# Patient Record
Sex: FEMALE | Race: BLACK OR AFRICAN AMERICAN | NOT HISPANIC OR LATINO | Employment: UNEMPLOYED | URBAN - METROPOLITAN AREA
[De-identification: names, ages, dates, MRNs, and addresses within clinical notes are randomized per-mention and may not be internally consistent; named-entity substitution may affect disease eponyms.]

---

## 2023-05-24 ENCOUNTER — TELEPHONE (OUTPATIENT)
Dept: PRIMARY CARE | Facility: CLINIC | Age: 48
End: 2023-05-24
Payer: COMMERCIAL

## 2023-05-24 DIAGNOSIS — Z12.31 ENCOUNTER FOR SCREENING MAMMOGRAM FOR MALIGNANT NEOPLASM OF BREAST: Primary | ICD-10-CM

## 2023-05-25 ENCOUNTER — TELEPHONE (OUTPATIENT)
Dept: PRIMARY CARE | Facility: CLINIC | Age: 48
End: 2023-05-25

## 2023-07-07 ENCOUNTER — OFFICE VISIT (OUTPATIENT)
Dept: PRIMARY CARE | Facility: CLINIC | Age: 48
End: 2023-07-07
Payer: COMMERCIAL

## 2023-07-07 VITALS
TEMPERATURE: 97.2 F | SYSTOLIC BLOOD PRESSURE: 160 MMHG | HEIGHT: 64 IN | WEIGHT: 150 LBS | HEART RATE: 95 BPM | DIASTOLIC BLOOD PRESSURE: 96 MMHG | BODY MASS INDEX: 25.61 KG/M2 | OXYGEN SATURATION: 99 %

## 2023-07-07 DIAGNOSIS — L84 PLANTAR CALLUS: ICD-10-CM

## 2023-07-07 DIAGNOSIS — L40.9 PSORIASIS: Primary | ICD-10-CM

## 2023-07-07 DIAGNOSIS — Z72.0 TOBACCO USE: ICD-10-CM

## 2023-07-07 DIAGNOSIS — L40.50 PSORIATIC ARTHROPATHY (MULTI): ICD-10-CM

## 2023-07-07 PROBLEM — M19.90 ARTHRITIS: Status: ACTIVE | Noted: 2023-07-07

## 2023-07-07 PROBLEM — R92.8 ABNORMAL MAMMOGRAM: Status: ACTIVE | Noted: 2023-07-07

## 2023-07-07 PROBLEM — L30.9 ECZEMA: Status: ACTIVE | Noted: 2023-07-07

## 2023-07-07 PROBLEM — K62.5 BRBPR (BRIGHT RED BLOOD PER RECTUM): Status: ACTIVE | Noted: 2023-07-07

## 2023-07-07 PROBLEM — R10.32 ABDOMINAL PAIN, LLQ: Status: ACTIVE | Noted: 2023-07-07

## 2023-07-07 PROBLEM — L85.1: Status: ACTIVE | Noted: 2023-07-07

## 2023-07-07 PROBLEM — K92.1 BLOOD IN STOOL: Status: ACTIVE | Noted: 2023-07-07

## 2023-07-07 PROBLEM — K64.9 HEMORRHOIDS: Status: ACTIVE | Noted: 2023-07-07

## 2023-07-07 PROBLEM — E55.9 VITAMIN D DEFICIENCY: Status: ACTIVE | Noted: 2023-07-07

## 2023-07-07 PROBLEM — F32.A DEPRESSION: Status: ACTIVE | Noted: 2023-07-07

## 2023-07-07 PROBLEM — K58.1 IRRITABLE BOWEL SYNDROME WITH CONSTIPATION: Status: ACTIVE | Noted: 2023-07-07

## 2023-07-07 PROBLEM — M62.89 PELVIC FLOOR TENSION: Status: ACTIVE | Noted: 2023-07-07

## 2023-07-07 PROCEDURE — 99214 OFFICE O/P EST MOD 30 MIN: CPT | Performed by: NURSE PRACTITIONER

## 2023-07-07 RX ORDER — ASPIRIN 81 MG
1 TABLET, DELAYED RELEASE (ENTERIC COATED) ORAL DAILY
COMMUNITY
Start: 2021-07-13

## 2023-07-07 RX ORDER — FLUOCINONIDE CREAM (EMULSIFIED BASE) 0.5 MG/G
CREAM TOPICAL 2 TIMES DAILY
COMMUNITY

## 2023-07-07 RX ORDER — BROMPHENIRAMINE MALEATE, DEXTROMETHORPHAN HBR, PHENYLEPHRINE HCL, DIPHENHYDRAMINE HCL, PHENYLEPHRINE HCL 0.52G
KIT ORAL
COMMUNITY
Start: 2021-04-30

## 2023-07-07 RX ORDER — ERGOCALCIFEROL 1.25 MG/1
CAPSULE ORAL
COMMUNITY

## 2023-07-07 RX ORDER — HYDROCORTISONE ACETATE 25 MG/1
SUPPOSITORY RECTAL
COMMUNITY
Start: 2022-04-12

## 2023-07-07 RX ORDER — METHYLPREDNISOLONE 4 MG/1
TABLET ORAL
Qty: 21 TABLET | Refills: 0 | Status: SHIPPED | OUTPATIENT
Start: 2023-07-07 | End: 2023-07-14

## 2023-07-07 RX ORDER — MELOXICAM 15 MG/1
15 TABLET ORAL DAILY
Qty: 30 TABLET | Refills: 11 | Status: SHIPPED | OUTPATIENT
Start: 2023-07-07 | End: 2024-07-06

## 2023-07-07 ASSESSMENT — PATIENT HEALTH QUESTIONNAIRE - PHQ9
2. FEELING DOWN, DEPRESSED OR HOPELESS: NOT AT ALL
SUM OF ALL RESPONSES TO PHQ9 QUESTIONS 1 & 2: 0
1. LITTLE INTEREST OR PLEASURE IN DOING THINGS: NOT AT ALL

## 2023-07-07 NOTE — PROGRESS NOTES
"Subjective   Patient ID: Julia Benites is a 48 y.o. female who presents for Follow-up (PAIN ALL OVER ESPECIALLY FEET).  She has psoriatic arthritis , she has tried treatments for psoriasis , the biologics were too strong for her , she was on methotrexate and on prednisone , currently is not on anything   Has psoriasis mostly on the legs and feet   Now she feels the aching all over her body.  She still smokes occasionally   She had a colonoscopy   It is hard to get up in the am with her arthritis and move around      I spent 23 minutes with the patients and 10 minutes writing the chart     Review of Systems    Objective   BP (!) 160/96   Pulse 95   Temp 36.2 °C (97.2 °F)   Ht 1.626 m (5' 4\")   Wt 68 kg (150 lb)   SpO2 99%   BMI 25.75 kg/m²     Physical Exam  Vitals and nursing note reviewed.   Constitutional:       Appearance: Normal appearance.   HENT:      Head: Normocephalic.      Nose: Nose normal.   Eyes:      Conjunctiva/sclera: Conjunctivae normal.      Pupils: Pupils are equal, round, and reactive to light.   Cardiovascular:      Rate and Rhythm: Normal rate and regular rhythm.   Pulmonary:      Effort: Pulmonary effort is normal.      Breath sounds: Normal breath sounds.   Abdominal:      General: Abdomen is flat. Bowel sounds are normal.      Palpations: Abdomen is soft.   Musculoskeletal:         General: Normal range of motion.      Cervical back: Normal range of motion and neck supple.        Feet:    Feet:      Comments: Plantar callus or a corn   Skin:     General: Skin is warm and dry.             Comments:  Dry Plaque lesions on the legs    Neurological:      General: No focal deficit present.      Mental Status: She is alert and oriented to person, place, and time. Mental status is at baseline.   Psychiatric:         Mood and Affect: Mood normal.         Behavior: Behavior normal.         Thought Content: Thought content normal.         Judgment: Judgment normal.         Assessment/Plan "   Problem List Items Addressed This Visit       Psoriasis - Primary    Relevant Medications    methylPREDNISolone (Medrol Dospak) 4 mg tablets    fluocinonide (Lidex) 0.05 % cream    Other Relevant Orders    Referral to Dermatology    Psoriatic arthropathy (CMS/[facility])    Relevant Medications    meloxicam (Mobic) 15 mg tablet    Other Relevant Orders    Referral to Rheumatology     Other Visit Diagnoses       Plantar callus        Relevant Orders    Referral to Podiatry    Tobacco use

## 2023-07-09 RX ORDER — FLUOCINONIDE 0.5 MG/G
CREAM TOPICAL 2 TIMES DAILY PRN
Qty: 60 G | Refills: 2 | Status: SHIPPED | OUTPATIENT
Start: 2023-07-09 | End: 2024-07-08

## 2023-08-09 NOTE — PATIENT INSTRUCTIONS
Some people see if a gluten fee diet will help   Exercise   Quitting smoking helps   Getting more sleep helps   Hopefully you can sleep better   If you have anxiety   Look at the eugenio mindfulness       It may be good to take something for anxiety so you can quit smoking   Follow up in 3 month     No

## 2023-10-13 ENCOUNTER — TELEMEDICINE (OUTPATIENT)
Dept: PRIMARY CARE | Facility: CLINIC | Age: 48
End: 2023-10-13
Payer: COMMERCIAL

## 2023-10-13 DIAGNOSIS — L40.50 PSORIATIC ARTHROPATHY (MULTI): Primary | ICD-10-CM

## 2023-10-13 PROCEDURE — 99213 OFFICE O/P EST LOW 20 MIN: CPT | Performed by: NURSE PRACTITIONER

## 2023-10-13 NOTE — LETTER
October 13, 2023     Patient: Julia Benites   YOB: 1975   Date of Visit: 10/13/2023       To Whom It May Concern:    Julia Benites was seen in my clinic on 10/13/2023. Please excuse Julia for her absence from work on this day to make the appointment.    If you have any questions or concerns, please don't hesitate to call.         Sincerely,         CAYLA Robbins-CNP

## 2023-10-22 NOTE — PROGRESS NOTES
2Subjective   Patient ID: Julia Benites is a 48 y.o. female who presents for No chief complaint on file..    HPI   The patient was not feeling well and wanted to talk   Her joints bother her , feet ankles knees hip all of the joint , for the last since the age of 35   Her psoriasis is all over . She has not told any of her dermatolologists about her joint pains before   Advised her about psoriatic arthropathy   She no longer has hair on her legs   We had a shared decision for her to follow up in derm    I suggested Dr Greene who specializes in psoriasis  I am recommending for now some prednisone   Review of Systems    Objective   There were no vitals taken for this visit.    Physical Exam    Assessment/Plan   Problem List Items Addressed This Visit             ICD-10-CM    Psoriatic arthropathy (CMS/[facility]) - Primary L40.50    Relevant Medications    predniSONE (Deltasone) 20 mg tablet    Other Relevant Orders    Arthritis Panel (CMS)    Referral to Dermatology

## 2023-10-24 RX ORDER — PREDNISONE 20 MG/1
TABLET ORAL
Qty: 18 TABLET | Refills: 0 | Status: SHIPPED | OUTPATIENT
Start: 2023-10-24

## 2023-11-06 ENCOUNTER — APPOINTMENT (OUTPATIENT)
Dept: RADIOLOGY | Facility: HOSPITAL | Age: 48
End: 2023-11-06
Payer: COMMERCIAL

## 2023-11-06 ENCOUNTER — HOSPITAL ENCOUNTER (EMERGENCY)
Facility: HOSPITAL | Age: 48
Discharge: HOME | End: 2023-11-06
Attending: INTERNAL MEDICINE
Payer: COMMERCIAL

## 2023-11-06 VITALS
OXYGEN SATURATION: 100 % | WEIGHT: 140 LBS | TEMPERATURE: 98.4 F | HEART RATE: 63 BPM | BODY MASS INDEX: 23.9 KG/M2 | RESPIRATION RATE: 16 BRPM | HEIGHT: 64 IN | SYSTOLIC BLOOD PRESSURE: 155 MMHG | DIASTOLIC BLOOD PRESSURE: 93 MMHG

## 2023-11-06 DIAGNOSIS — N12 PYELONEPHRITIS: ICD-10-CM

## 2023-11-06 DIAGNOSIS — R31.0 GROSS HEMATURIA: Primary | ICD-10-CM

## 2023-11-06 LAB
ALBUMIN SERPL BCP-MCNC: 4.4 G/DL (ref 3.4–5)
ALP SERPL-CCNC: 32 U/L (ref 33–110)
ALT SERPL W P-5'-P-CCNC: 7 U/L (ref 7–45)
ANION GAP SERPL CALC-SCNC: 15 MMOL/L (ref 10–20)
APPEARANCE UR: ABNORMAL
APTT PPP: 29 SECONDS (ref 27–38)
AST SERPL W P-5'-P-CCNC: 22 U/L (ref 9–39)
B-HCG SERPL-ACNC: <2 MIU/ML
BASOPHILS # BLD AUTO: 0.03 X10*3/UL (ref 0–0.1)
BASOPHILS NFR BLD AUTO: 0.3 %
BILIRUB SERPL-MCNC: 0.4 MG/DL (ref 0–1.2)
BILIRUB UR STRIP.AUTO-MCNC: NEGATIVE MG/DL
BUN SERPL-MCNC: 14 MG/DL (ref 6–23)
CALCIUM SERPL-MCNC: 9.5 MG/DL (ref 8.6–10.3)
CHLORIDE SERPL-SCNC: 107 MMOL/L (ref 98–107)
CO2 SERPL-SCNC: 20 MMOL/L (ref 21–32)
COLOR UR: ABNORMAL
CREAT SERPL-MCNC: 0.81 MG/DL (ref 0.5–1.05)
EOSINOPHIL # BLD AUTO: 0.11 X10*3/UL (ref 0–0.7)
EOSINOPHIL NFR BLD AUTO: 1.3 %
ERYTHROCYTE [DISTWIDTH] IN BLOOD BY AUTOMATED COUNT: 14.1 % (ref 11.5–14.5)
GFR SERPL CREATININE-BSD FRML MDRD: 89 ML/MIN/1.73M*2
GLUCOSE SERPL-MCNC: 72 MG/DL (ref 74–99)
GLUCOSE UR STRIP.AUTO-MCNC: NEGATIVE MG/DL
HCG UR QL IA.RAPID: NEGATIVE
HCT VFR BLD AUTO: 32.8 % (ref 36–46)
HGB BLD-MCNC: 11.1 G/DL (ref 12–16)
HOLD SPECIMEN: NORMAL
IMM GRANULOCYTES # BLD AUTO: 0.04 X10*3/UL (ref 0–0.7)
IMM GRANULOCYTES NFR BLD AUTO: 0.5 % (ref 0–0.9)
INR PPP: 1.1 (ref 0.9–1.1)
KETONES UR STRIP.AUTO-MCNC: NEGATIVE MG/DL
LEUKOCYTE ESTERASE UR QL STRIP.AUTO: ABNORMAL
LYMPHOCYTES # BLD AUTO: 2.22 X10*3/UL (ref 1.2–4.8)
LYMPHOCYTES NFR BLD AUTO: 25.6 %
MCH RBC QN AUTO: 29.9 PG (ref 26–34)
MCHC RBC AUTO-ENTMCNC: 33.8 G/DL (ref 32–36)
MCV RBC AUTO: 88 FL (ref 80–100)
MONOCYTES # BLD AUTO: 0.47 X10*3/UL (ref 0.1–1)
MONOCYTES NFR BLD AUTO: 5.4 %
NEUTROPHILS # BLD AUTO: 5.79 X10*3/UL (ref 1.2–7.7)
NEUTROPHILS NFR BLD AUTO: 66.9 %
NITRITE UR QL STRIP.AUTO: POSITIVE
NRBC BLD-RTO: 0 /100 WBCS (ref 0–0)
PH UR STRIP.AUTO: 6 [PH]
PLATELET # BLD AUTO: 216 X10*3/UL (ref 150–450)
POTASSIUM SERPL-SCNC: 4.3 MMOL/L (ref 3.5–5.3)
PROT SERPL-MCNC: 7.8 G/DL (ref 6.4–8.2)
PROT UR STRIP.AUTO-MCNC: ABNORMAL MG/DL
PROTHROMBIN TIME: 12.3 SECONDS (ref 9.8–12.8)
RBC # BLD AUTO: 3.71 X10*6/UL (ref 4–5.2)
RBC # UR STRIP.AUTO: ABNORMAL /UL
RBC #/AREA URNS AUTO: >20 /HPF
SODIUM SERPL-SCNC: 138 MMOL/L (ref 136–145)
SP GR UR STRIP.AUTO: 1.01
SQUAMOUS #/AREA URNS AUTO: ABNORMAL /HPF
UROBILINOGEN UR STRIP.AUTO-MCNC: <2 MG/DL
WBC # BLD AUTO: 8.7 X10*3/UL (ref 4.4–11.3)
WBC #/AREA URNS AUTO: >50 /HPF
WBC CLUMPS #/AREA URNS AUTO: ABNORMAL /HPF
YEAST BUDDING #/AREA UR COMP ASSIST: PRESENT /HPF

## 2023-11-06 PROCEDURE — 99285 EMERGENCY DEPT VISIT HI MDM: CPT | Mod: 25 | Performed by: INTERNAL MEDICINE

## 2023-11-06 PROCEDURE — 2550000001 HC RX 255 CONTRASTS: Performed by: INTERNAL MEDICINE

## 2023-11-06 PROCEDURE — 2500000004 HC RX 250 GENERAL PHARMACY W/ HCPCS (ALT 636 FOR OP/ED)

## 2023-11-06 PROCEDURE — 96365 THER/PROPH/DIAG IV INF INIT: CPT | Mod: 59

## 2023-11-06 PROCEDURE — 99284 EMERGENCY DEPT VISIT MOD MDM: CPT | Mod: 25

## 2023-11-06 PROCEDURE — 74177 CT ABD & PELVIS W/CONTRAST: CPT | Performed by: RADIOLOGY

## 2023-11-06 PROCEDURE — 87086 URINE CULTURE/COLONY COUNT: CPT | Mod: AHULAB | Performed by: INTERNAL MEDICINE

## 2023-11-06 PROCEDURE — 85730 THROMBOPLASTIN TIME PARTIAL: CPT

## 2023-11-06 PROCEDURE — 81001 URINALYSIS AUTO W/SCOPE: CPT

## 2023-11-06 PROCEDURE — 80053 COMPREHEN METABOLIC PANEL: CPT

## 2023-11-06 PROCEDURE — 84702 CHORIONIC GONADOTROPIN TEST: CPT

## 2023-11-06 PROCEDURE — 85025 COMPLETE CBC W/AUTO DIFF WBC: CPT

## 2023-11-06 PROCEDURE — 87186 SC STD MICRODIL/AGAR DIL: CPT | Mod: AHULAB | Performed by: INTERNAL MEDICINE

## 2023-11-06 PROCEDURE — 81025 URINE PREGNANCY TEST: CPT

## 2023-11-06 PROCEDURE — 85610 PROTHROMBIN TIME: CPT

## 2023-11-06 PROCEDURE — 36415 COLL VENOUS BLD VENIPUNCTURE: CPT

## 2023-11-06 PROCEDURE — 74177 CT ABD & PELVIS W/CONTRAST: CPT

## 2023-11-06 PROCEDURE — 81001 URINALYSIS AUTO W/SCOPE: CPT | Performed by: INTERNAL MEDICINE

## 2023-11-06 RX ORDER — CIPROFLOXACIN 500 MG/1
500 TABLET ORAL 2 TIMES DAILY
Qty: 14 TABLET | Refills: 0 | Status: SHIPPED | OUTPATIENT
Start: 2023-11-06 | End: 2023-11-13

## 2023-11-06 RX ADMIN — IOHEXOL 75 ML: 350 INJECTION, SOLUTION INTRAVENOUS at 19:00

## 2023-11-06 RX ADMIN — CEFTRIAXONE 2 G: 2 INJECTION, POWDER, FOR SOLUTION INTRAMUSCULAR; INTRAVENOUS at 19:47

## 2023-11-06 ASSESSMENT — PAIN - FUNCTIONAL ASSESSMENT: PAIN_FUNCTIONAL_ASSESSMENT: 0-10

## 2023-11-06 ASSESSMENT — PAIN SCALES - GENERAL: PAINLEVEL_OUTOF10: 0 - NO PAIN

## 2023-11-06 NOTE — ED TRIAGE NOTES
PT ARRIVED TO TRIAGE FOR BLOOD IN HER URINE. PT STS IT STARTED 2 DAYS AGO. PT STS THE TOILET IS COMPLETELY RED WHEN SHE URINATES. PT DENIES ANY OTHER COMPLAINTS AT THIS TIME. PT DENIES HAVING TO USE A PAD OR BLOOD ON TISSUE WHEN SHE WIPES. PT STS IT IS HAPPENING EVERY TIME SHE URINATES. VSS AT THIS TIME. NO OBVIOUS SIGNS OF DISTRESS NOTED. PT GIVEN URINE CUP AT THIS TIME. L

## 2023-11-06 NOTE — ED PROVIDER NOTES
HPI   Chief Complaint   Patient presents with    Blood in Urine       HPI    HISTORY OF PRESENT ILLNESS:  48 y.o. female presenting to the ED with complaint of blood in the urine.  She states that she has had gross hematuria every time that she urinates for the past 2 days.  She denies any blood in her clothing or underwear, has no blood during bowel movements.  She is sure that it is coming from her urine and not from her vagina or anus. Has no blood on the toilet paper after wiping. Occurs every time she urinates for the past 2 days. She has no other associated symptoms at all.  She has no dysuria, no urgency or frequency.  No burning or pain with urination.  No abdominal pain.  No flank pain.  No back pain.  No vaginal pain, burning, itching, or abnormal discharge.  Last menstrual period was 2 weeks ago.  Has had a tubal ligation and denies chance pregnancy.  She states that she believes she had a few episodes like this over the past 2 years, but they normally resolve spontaneously and were only present during a few episodes of urinating.  She states that she otherwise feels well and has no complaints.  She does have a history of smoking, everyday smoker for multiple years.  She states that she had a kidney infection a few years ago, she did have hematuria at that time, but that was very painful and accompanied by UTI symptoms which she does not currently have.  No other complaints or symptoms voiced.      PMH: Psoriasis, sickle cell trait, IBS, hemorrhoids  Family history: noncontributory  Social history: +smoker, no ETOH, no illicit substances    12 point review of systems was performed and is negative unless otherwise specified in HPI.           No data recorded                Patient History   Past Medical History:   Diagnosis Date    Encounter for other general examination     Podiatry visit, routine    Encounter for other orthopedic aftercare     Orthotic training    Personal history of diseases of the skin  and subcutaneous tissue 02/10/2014    History of psoriasis    Personal history of other diseases of the musculoskeletal system and connective tissue     Personal history of rheumatoid arthritis     Past Surgical History:   Procedure Laterality Date    OTHER SURGICAL HISTORY  04/30/2021    Tubal ligation bilateral     No family history on file.  Social History     Tobacco Use    Smoking status: Some Days     Types: Cigarettes    Smokeless tobacco: Current   Substance Use Topics    Alcohol use: Yes     Alcohol/week: 1.0 standard drink of alcohol     Types: 1 Glasses of wine per week    Drug use: Never       Physical Exam   ED Triage Vitals [11/06/23 1243]   Temp Heart Rate Resp BP   36.9 °C (98.4 °F) 63 16 (!) 155/93      SpO2 Temp Source Heart Rate Source Patient Position   100 % Oral -- Sitting      BP Location FiO2 (%)     Right arm --       Physical Exam  Constitutional:       General: She is not in acute distress.     Appearance: She is not toxic-appearing.   HENT:      Head: Normocephalic and atraumatic.      Mouth/Throat:      Mouth: Mucous membranes are moist.   Eyes:      General: No scleral icterus.     Extraocular Movements: Extraocular movements intact.      Pupils: Pupils are equal, round, and reactive to light.   Cardiovascular:      Rate and Rhythm: Normal rate and regular rhythm.      Pulses: Normal pulses.   Pulmonary:      Effort: Pulmonary effort is normal. No respiratory distress.   Abdominal:      General: There is no distension.      Palpations: Abdomen is soft.      Tenderness: There is no abdominal tenderness. There is no right CVA tenderness, left CVA tenderness or guarding.   Musculoskeletal:         General: Normal range of motion.      Cervical back: Normal range of motion. No rigidity.   Skin:     General: Skin is warm and dry.      Capillary Refill: Capillary refill takes less than 2 seconds.   Neurological:      General: No focal deficit present.      Mental Status: She is alert and  oriented to person, place, and time.      Gait: Gait normal.   Psychiatric:         Mood and Affect: Mood normal.         Behavior: Behavior normal.         Judgment: Judgment normal.     ED Course & MDM        Medical Decision Making    ED course / MDM     Summary:  Patient presented with gross hematuria for the past 2 days.  No other symptoms or complaints.  Is a smoker.  Vital signs are stable, mildly hypertensive in triage at 155/93, patient is very well-appearing.  She has no pain or complaints currently.  Physical exam is unremarkable.  No abdominal pain or tenderness, no flank pain or CVA tenderness.  No bruising, petechiae, or purpura noted.  IV established, labs drawn.  Labs show no significant electrode abnormalities, normal kidney and liver function.  Normal coagulation screen.  Negative hCG.  Urinalysis appears infected, positive blood, nitrites, and leukocyte esterase, as well as budding yeast.  She does not have any other urinary symptoms, no dysuria, no urgency or frequency, no lower abdominal pain or suprapubic pressure. Dose of rocephin IV ordered. CT scan ordered to further evaluate her hematuria.  Patient case signed out to ED attending Dr. Gamino, pending CT scan, CBC, reevaluation, and final disposition.    This note has been transcribed using voice recognition and may contain grammatical errors, misplaced words, incorrect words, incorrect phrases or other errors.     Procedure  Procedures     Kim Conner PA-C  11/06/23 3624

## 2023-11-07 NOTE — DISCHARGE INSTRUCTIONS
You were seen today for blood in your urine.  You have a urinary tract infection.  You were given antibiotics for this. You were also found to have some cysts in your kidneys.  Follow this up with your primary doctor. Your evaluation was not concerning for an emergency at this time. Please see the attached information sheet for information about your condition, how to care for your condition at home, and reasons to return to the emergency department. Take any prescriptions written today as prescribed. You should call your primary care provider within 24 hours to tell them about today's visit, including any new medications or medication changes, as he or she may want to see you in the office for further evaluation. If you do not have a primary care provider, call  [phone] for an appointment. We offer in-person office visits as well as virtual options. Please do not hesitate to call  [phone] or return to the emergency department with any new or unresolved concerns or symptoms. Thank you for choosing [facility] for your care.

## 2023-11-09 LAB — BACTERIA UR CULT: ABNORMAL

## 2023-11-11 ENCOUNTER — TELEPHONE (OUTPATIENT)
Dept: PHARMACY | Facility: HOSPITAL | Age: 48
End: 2023-11-11
Payer: COMMERCIAL

## 2023-11-11 NOTE — PROGRESS NOTES
EDPD Note: Rapid Result Review    Reviewed Ms. Julia Benites 's chart regarding a positive Escherichia coli urine culture/result that was taken during their recent emergency room visit. The patient was not told about these results prior to leaving the emergency department. Therefore, patient was contacted and given proper education. Patient reported that hematuria has improved, and that they have not noticed any UTI/systemic symptoms (increased urgency/frequency, dysuria, suprapubic pain, flank pain, fever, nausea/vomiting). Previously prescribed ciprofloxacin 500 m tablet BID x 7 days, but given patient came to the ED without UTI symptoms and is not currently experiencing any, advised patient to stop antibiotic/discard remainder. Patient had further questions related to what has been causing blood in the urine, and directed patient to set up a follow-up appointment with her PCP for further treatment/testing. Encouraged patient to return to the ED or contact their PCP right away with any changes in their current symptoms. Patient acknowledged understanding.     Susceptibility data from last 90 days.  Collected Specimen Info Organism Ampicillin Cefazolin Cefazolin (uncomplicated UTIs only) Ciprofloxacin Gentamicin Nitrofurantoin Piperacillin/Tazobactam Trimethoprim/Sulfamethoxazole   23 Urine from Clean Catch/Voided Escherichia coli S S S S S S S S     Urine Culture  Order: [id number] - Reflex for Order [id number]  Collected 2023 12:53       Status: Final result       Visible to patient: Yes (not seen)    Specimen Information: Clean Catch/Voided; Urine   2 Result Notes  Urine Culture >100,000 Escherichia coli Abnormal            Resulting Agency: [facility]     Susceptibility     Escherichia coli     MICROSCAN    $$ Ampicillin Susceptible    $ Cefazolin Susceptible     Cefazolin (uncomplicated UTIs only) Susceptible    $ Ciprofloxacin Susceptible    $ Gentamicin Susceptible    $ Nitrofurantoin Susceptible     $$ Piperacillin/Tazobactam Susceptible    $ Trimethoprim/Sulfamethoxazole Susceptible                  Specimen Collected: 11/06/23 12:53 Last Resulted: 11/09/23 11:26               No further follow up needed from EDPD Team.     Amanda BenzD, [facility] Med[facility] PGY1 Pharmacy Resident   Meds [facility] Services

## 2023-12-14 ASSESSMENT — DERMATOLOGY QUALITY OF LIFE (QOL) ASSESSMENT
DATE THE QUALITY-OF-LIFE ASSESSMENT WAS COMPLETED: 12/20/2012
RATE HOW BOTHERED YOU ARE BY SYMPTOMS OF YOUR SKIN PROBLEM (EG, ITCHING, STINGING BURNING, HURTING OR SKIN IRRITATION): 6 - ALWAYS BOTHERED
RATE HOW BOTHERED YOU ARE BY EFFECTS OF YOUR SKIN PROBLEMS ON YOUR ACTIVITIES (EG, GOING OUT, ACCOMPLISHING WHAT YOU WANT, WORK ACTIVITIES OR YOUR RELATIONSHIPS WITH OTHERS): 6 - ALWAYS BOTHERED
RATE HOW BOTHERED YOU ARE BY SYMPTOMS OF YOUR SKIN PROBLEM (EG, ITCHING, STINGING BURNING, HURTING OR SKIN IRRITATION): 6 - ALWAYS BOTHERED
WHAT SINGLE SKIN CONDITION LISTED BELOW IS THE PATIENT ANSWERING THE QUALITY-OF-LIFE ASSESSMENT QUESTIONS ABOUT: PSORIASIS
DATE THE QUALITY-OF-LIFE ASSESSMENT WAS COMPLETED: 62811
WHAT SINGLE SKIN CONDITION LISTED BELOW IS THE PATIENT ANSWERING THE QUALITY-OF-LIFE ASSESSMENT QUESTIONS ABOUT: PSORIASIS
RATE HOW BOTHERED YOU ARE BY EFFECTS OF YOUR SKIN PROBLEMS ON YOUR ACTIVITIES (EG, GOING OUT, ACCOMPLISHING WHAT YOU WANT, WORK ACTIVITIES OR YOUR RELATIONSHIPS WITH OTHERS): 6 - ALWAYS BOTHERED
RATE HOW EMOTIONALLY BOTHERED YOU ARE BY YOUR SKIN PROBLEM (FOR EXAMPLE, WORRY, EMBARRASSMENT, FRUSTRATION): 6 - ALWAYS BOTHERED
RATE HOW EMOTIONALLY BOTHERED YOU ARE BY YOUR SKIN PROBLEM (FOR EXAMPLE, WORRY, EMBARRASSMENT, FRUSTRATION): 6 - ALWAYS BOTHERED

## 2023-12-15 ENCOUNTER — APPOINTMENT (OUTPATIENT)
Dept: DERMATOLOGY | Facility: CLINIC | Age: 48
End: 2023-12-15
Payer: COMMERCIAL

## 2025-06-10 ENCOUNTER — HOSPITAL ENCOUNTER (OUTPATIENT)
Dept: RADIOLOGY | Facility: CLINIC | Age: 50
End: 2025-06-10